# Patient Record
Sex: FEMALE | Race: AMERICAN INDIAN OR ALASKA NATIVE
[De-identification: names, ages, dates, MRNs, and addresses within clinical notes are randomized per-mention and may not be internally consistent; named-entity substitution may affect disease eponyms.]

---

## 2020-05-06 ENCOUNTER — HOSPITAL ENCOUNTER (EMERGENCY)
Dept: HOSPITAL 5 - ED | Age: 30
Discharge: HOME | End: 2020-05-06
Payer: COMMERCIAL

## 2020-05-06 VITALS — DIASTOLIC BLOOD PRESSURE: 97 MMHG | SYSTOLIC BLOOD PRESSURE: 152 MMHG

## 2020-05-06 DIAGNOSIS — F12.10: ICD-10-CM

## 2020-05-06 DIAGNOSIS — R07.89: Primary | ICD-10-CM

## 2020-05-06 DIAGNOSIS — I20.9: ICD-10-CM

## 2020-05-06 DIAGNOSIS — Z79.899: ICD-10-CM

## 2020-05-06 DIAGNOSIS — F15.10: ICD-10-CM

## 2020-05-06 DIAGNOSIS — F19.180: ICD-10-CM

## 2020-05-06 LAB
ALBUMIN SERPL-MCNC: 4.7 G/DL (ref 3.9–5)
ALT SERPL-CCNC: 21 UNITS/L (ref 7–56)
BASOPHILS # (AUTO): 0.1 K/MM3 (ref 0–0.1)
BASOPHILS NFR BLD AUTO: 0.7 % (ref 0–1.8)
BENZODIAZEPINES SCREEN,URINE: (no result)
BILIRUB UR QL STRIP: (no result)
BLOOD UR QL VISUAL: (no result)
BUN SERPL-MCNC: 7 MG/DL (ref 7–17)
BUN/CREAT SERPL: 9 %
CALCIUM SERPL-MCNC: 10.2 MG/DL (ref 8.4–10.2)
EOSINOPHIL # BLD AUTO: 0.1 K/MM3 (ref 0–0.4)
EOSINOPHIL NFR BLD AUTO: 1 % (ref 0–4.3)
HCT VFR BLD CALC: 39.5 % (ref 30.3–42.9)
HEMOLYSIS INDEX: 1
HGB BLD-MCNC: 13 GM/DL (ref 10.1–14.3)
LYMPHOCYTES # BLD AUTO: 1.9 K/MM3 (ref 1.2–5.4)
LYMPHOCYTES NFR BLD AUTO: 20.6 % (ref 13.4–35)
MCHC RBC AUTO-ENTMCNC: 33 % (ref 30–34)
MCV RBC AUTO: 79 FL (ref 79–97)
METHADONE SCREEN,URINE: (no result)
MONOCYTES # (AUTO): 0.5 K/MM3 (ref 0–0.8)
MONOCYTES % (AUTO): 5.6 % (ref 0–7.3)
OPIATE SCREEN,URINE: (no result)
PH UR STRIP: 7 [PH] (ref 5–7)
PLATELET # BLD: 245 K/MM3 (ref 140–440)
PROT UR STRIP-MCNC: (no result) MG/DL
RBC # BLD AUTO: 4.99 M/MM3 (ref 3.65–5.03)
RBC #/AREA URNS HPF: < 1 /HPF (ref 0–6)
UROBILINOGEN UR-MCNC: < 2 MG/DL (ref ?–2)
WBC #/AREA URNS HPF: 1 /HPF (ref 0–6)

## 2020-05-06 PROCEDURE — 85025 COMPLETE CBC W/AUTO DIFF WBC: CPT

## 2020-05-06 PROCEDURE — 84703 CHORIONIC GONADOTROPIN ASSAY: CPT

## 2020-05-06 PROCEDURE — 80307 DRUG TEST PRSMV CHEM ANLYZR: CPT

## 2020-05-06 PROCEDURE — 81001 URINALYSIS AUTO W/SCOPE: CPT

## 2020-05-06 PROCEDURE — 96374 THER/PROPH/DIAG INJ IV PUSH: CPT

## 2020-05-06 PROCEDURE — 84484 ASSAY OF TROPONIN QUANT: CPT

## 2020-05-06 PROCEDURE — 99284 EMERGENCY DEPT VISIT MOD MDM: CPT

## 2020-05-06 PROCEDURE — 36415 COLL VENOUS BLD VENIPUNCTURE: CPT

## 2020-05-06 PROCEDURE — 80053 COMPREHEN METABOLIC PANEL: CPT

## 2020-05-06 PROCEDURE — 93005 ELECTROCARDIOGRAM TRACING: CPT

## 2020-05-06 PROCEDURE — 71045 X-RAY EXAM CHEST 1 VIEW: CPT

## 2020-05-06 NOTE — XRAY REPORT
CHEST 1 VIEW 



INDICATION: 

Chest Pain.



COMPARISON: 

None.



FINDINGS:

Support devices: None.



Heart: Within normal limits. 

Lungs/Pleura: No acute air space or interstitial disease. 



Additional findings: None.



IMPRESSION: No acute abnormality.



Signer Name: Elliot Salgado MD 

Signed: 5/6/2020 2:19 AM

Workstation Name: [id number]

## 2020-05-06 NOTE — EMERGENCY DEPARTMENT REPORT
ED Chest Pain HPI





- General


Chief Complaint: Chest Pain


Stated Complaint: CHEST PAIN, SHORT OF BREATH


Source: patient


Mode of arrival: Ambulatory


Limitations: No Limitations





- History of Present Illness


Initial Comments: 





Patient is a A0 29-year-old -American female with past medical 

history anxiety and depression who presents to the ED with complaint of acute 

onset persistent severe left-sided chest pain that radiates to the left arm with

shortness of breath and palpitations for the last 1 hour after smoking marijuana

and methamphetamine prior to arrival in the ED.  Patient states that she was 

with a friend of has when she told her friend that she was extremely anxious but

does not have any medicine for the same when the friend offered some tablets 

claiming that the tablets were for anxiety and would help relieve her anxiety.  

Patient states that she took the tablets but was unsure what these were.  

Patient states that there after she ended up smoking marijuana with a friend and

started developing persistent chest pain which shortness of breath.  Patient 

states that she has never smoked these drugs before and this was her first time 

trying.  Patient also states that prior to using the drugs, they had been 

drinking together with a friend.  Patient denies diaphoresis, dizziness, 

headache, change in vision, syncope, fever, chills, cough, loss of cons

ciousness, sore throat, dysphagia, dysphonia, wheezing, abdominal pain, nausea, 

vomiting, diarrhea, hallucinations, suicidal ideation or homicidal ideation, 

neck pain or back pain.  


MD Complaint: chest pain (left-sided chest wall pain that radiates to the left 

arm), other (dyspnea)


-: Sudden, hour(s) (1)


Onset: during rest, associated with drug use (marijuana and amphetamines)


Pain Location: substernal, left chest


Pain Radiation: LUE (left shoulder and arm)


Severity: severe


Severity scale (0 -10): 10


Quality: tightness, heaviness, sharp, pressure


Consistency: constant


Improves With: nothing


Worsens With: nothing


re: dyspnea.  denies: nausea, vomting, diaphoresis, sense of impending doom


Other Symptoms: denies: cough, fever, syncope, rash, acid taste in mouth, leg 

swelling, palpitations, burping, other


Treatments Prior to Arrival: none


Aspirin use within the Past 7 Days: (0) No





- Related Data


On Oral Contraceptives: No


                                  Previous Rx's











 Medication  Instructions  Recorded  Last Taken  Type


 


Naproxen 500 mg PO Q12H PRN #20 tablet 20 Unknown Rx


 


hydrOXYzine PAMOATE [Vistaril] 25 mg PO Q6HR PRN #30 capsule 20 Unknown Rx











                                    Allergies











Allergy/AdvReac Type Severity Reaction Status Date / Time


 


No Known Allergies Allergy   Verified 20 01:33














Heart Score





- HEART Score


History: Slightly suspicious


EKG: Normal


Age: < 45


Risk factors: No known risk factors


Troponin: < normal limit


HEART Score: 0





- Critical Actions


Critical Actions: 0-3 pts:0.9-1.7%risk of adverse cardiac event.Candidate for 

discharge





ED Review of Systems


ROS: 


Stated complaint: CHEST PAIN, SHORT OF BREATH


Other details as noted in HPI





Constitutional: denies: chills, fever


Eyes: denies: eye pain, eye discharge, vision change


ENT: denies: ear pain, throat pain


Respiratory: shortness of breath.  denies: cough, wheezing


Cardiovascular: chest pain (left-sided chest wall).  denies: palpitations


Endocrine: no symptoms reported


Gastrointestinal: denies: abdominal pain, nausea, diarrhea


Genitourinary: denies: urgency, dysuria, discharge


Musculoskeletal: arthralgia (left arm pain).  denies: back pain, joint swelling


Skin: denies: rash, lesions


Neurological: denies: headache, weakness, paresthesias


Psychiatric: anxiety.  denies: depression, auditory hallucinations, visual 

hallucinations, homicidal thoughts, suicidal thoughts


Hematological/Lymphatic: denies: easy bleeding, easy bruising





ED Past Medical Hx





- Past Medical History


Previous Medical History?: No





- Surgical History


Past Surgical History?: No





- Social History


Smoking Status: Never Smoker


Substance Use Type: Alcohol, Marijuana, Methamphetamines





- Medications


Home Medications: 


                                Home Medications











 Medication  Instructions  Recorded  Confirmed  Last Taken  Type


 


Naproxen 500 mg PO Q12H PRN #20 tablet 20  Unknown Rx


 


hydrOXYzine PAMOATE [Vistaril] 25 mg PO Q6HR PRN #30 capsule 20  Unknown 

Rx














ED Physical Exam





- General


Limitations: No Limitations


General appearance: alert, in no apparent distress, anxious, obese





- Head


Head exam: Present: atraumatic, normocephalic, normal inspection





- Eye


Eye exam: Present: normal appearance, PERRL, EOMI.  Absent: scleral icterus, 

conjunctival injection, nystagmus


Pupils: Present: normal accommodation





- ENT


ENT exam: Present: normal exam, normal orophraynx, mucous membranes moist, TM's 

normal bilaterally, normal external ear exam





- Neck


Neck exam: Present: normal inspection, full ROM.  Absent: tenderness, 

meningismus, lymphadenopathy, thyromegaly





- Respiratory


Respiratory exam: Present: normal lung sounds bilaterally, chest wall tenderness

 (Palpable reproducible severe left-sided chest wall tenderness).  Absent: 

respiratory distress, wheezes, rales, rhonchi, stridor, accessory muscle use, 

decreased breath sounds





- Cardiovascular


Cardiovascular Exam: Present: regular rate, normal rhythm, normal heart sounds. 

 Absent: systolic murmur, diastolic murmur, rubs, gallop





- GI/Abdominal


GI/Abdominal exam: Present: soft, normal bowel sounds.  Absent: tenderness, 

guarding, rebound, hyperactive bowel sounds, hypoactive bowel sounds, 

organomegaly





- Extremities Exam


Extremities exam: Present: normal inspection, full ROM, normal capillary refill.

  Absent: tenderness, pedal edema





- Back Exam


Back exam: Present: normal inspection, full ROM.  Absent: tenderness, CVA 

tenderness (R), CVA tenderness (L), muscle spasm, paraspinal tenderness, 

vertebral tenderness





- Neurological Exam


Neurological exam: Present: alert, oriented X3, CN II-XII intact, normal gait, 

reflexes normal





- Psychiatric


Psychiatric exam: Present: normal affect, normal mood, anxious (and restless).  

Absent: suicidal ideation





- Skin


Skin exam: Present: warm, dry, intact, normal color.  Absent: rash





ED Course


                                   Vital Signs











  20





  01:35 02:20 02:40


 


Temperature 98.5 F  


 


Pulse Rate 78 104 H 139 H


 


Respiratory 20 23 26 H





Rate   


 


Blood Pressure 161/80  


 


O2 Sat by Pulse 96  100





Oximetry   














  20





  03:30 04:00 04:30


 


Temperature   


 


Pulse Rate 86 83 80


 


Respiratory 24 26 H 26 H





Rate   


 


Blood Pressure 149/89 153/95 143/95


 


O2 Sat by Pulse 100 99 100





Oximetry   














  20





  05:00


 


Temperature 


 


Pulse Rate 83


 


Respiratory 12





Rate 


 


Blood Pressure 152/97


 


O2 Sat by Pulse 100





Oximetry 














FREDERIC score





- Frederic Score


Age > 65: (0) No


Aspirin use within the Past 7 Days: (0) No


3 or more CAD Risk Factors: (0) No


2 or more Angina events in past 24 hrs: (0) No


Known CAD with more than 50% Stenosis: (0) No


Elevated Cardiac Markers: (0) No


ST Deviation Greater than 0.5mm: (0) No


FREDERIC Score: 0





ED Medical Decision Making





- Lab Data


Result diagrams: 


                                 20 02:31





                                 20 02:31





- EKG Data


EKG shows normal: sinus rhythm


Rate: normal





- EKG Data


Interpretation: normal EKG





20 05:43


The EKG shows normal sinus rhythm with ventricular rate of 78 bpm and no ST or T

 wave abnormalities or pathological Q waves.





- Radiology Data


Radiology results: report reviewed, image reviewed





Findings





[facility] 


[address] [place] [address] 





XRay Report 


Signed 





 Patient: FELIBERTO URBAN MR#: 


[id number] 


 : 1990 Acct:[id number] 





 Age/Sex: 29 / F ADM Date: 20 





 Loc: ED 


 Attending Dr: 








 Ordering Physician: SARAH SEGUNDO DO 


 Date of Service: 20 


 Procedure(s): XR chest 1V ap 


 Accession Number(s): [id number] 





 cc: SARAH SEGUNDO DO 





 Fluoro Time In Minutes: 





 CHEST 1 VIEW 





INDICATION: 


Chest Pain. 





COMPARISON: 


None. 





FINDINGS: 


Support devices: None. 





Heart: Within normal limits. 


Lungs/Pleura: No acute air space or interstitial disease. 





Additional findings: None. 





IMPRESSION: No acute abnormality. 





Signer Name: Elliot Salgado MD 


Signed: 2020 2:19 AM 


Workstation Name: [id number] 








 Transcribed By: ES 


 Dictated By: Elliot Salgado MD 


 Electronically Authenticated By: Elliot Salgado MD 


 Signed Date/Time: 20 











 DD/DT: 20 


 TD/TT: 





- Medical Decision Making





This is a A0 29-year-old -American female with past medical history 

anxiety and depression who presents to the ED with complaint of acute onset 

persistent severe left-sided chest pain that radiates to the left arm with 

shortness of breath and palpitations for the last 1 hour after smoking marijuana

 and methamphetamine prior to arrival in the ED.  Patient states that she was 

with a friend of has when she told her friend that she was extremely anxious but

 does not have any medicine for the same when the friend offered some tablets 

claiming that the tablets were for anxiety and would help relieve her anxiety.  

Patient states that she took the tablets but was unsure what these were.  

Patient states that there after she ended up smoking marijuana with a friend and

 started developing persistent chest pain which shortness of breath.  Patient 

states that she has never smoked these drugs before and this was her first time 

trying.  Patient also states that prior to using the drugs, they had been 

drinking together with a friend.  In the ED, patient is alert and oriented x3, 

anxious but in no acute distress, tachycardic, tachypneic and afebrile in 

triage.  Patient pacing back and forth in the room during the history taking and

 in between physical exam.  The EKG shows normal sinus rhythm with ventricular 

rate of 78 bpm, and no ST or T wave abnormalities or pathological Q waves.  

Chest x-ray shows no acute cardiopulmonary abnormalities or pneumonitis.  

Patient was treated with aspirin and also given Ativan for anxiety.  Lab test 

results were reviewed and showed acute hypokalemia of 3.0 mmol/L and mild 

hyperglycemia of 113 mg/dL.  Urine drug screen was presumptively positive for 

marijuana and amphetamines.  The rest of the lab test results were nonactionable

 including the initial and repeat troponin levels.  The vital signs were 

repeated after the patient was treated with medications and was normal.  Patient

 heart score is 0 based on the patient past medical history and risk factors for

 coronary artery disease.  On reevaluation, patient's chest pain resolved, 

anxiety also resolved and vital signs normalized with treatment.  Patient was 

discharged home with pain medication and Vistaril for anxiety and advised to fol

low-up with her primary care physician in 3 to 5 days for reevaluation.  Patient

 was also counseled on the dangers of drug abuse and the risks she poses for her

 health when using street drugs.  Patient verbalized understanding and promised 

that she would change her lifestyle.  Patient was advised to return to the ED 

immediately if symptoms get worse.





- Differential Diagnosis


CAD; Anxiety; Drug abuse; Costochondritis; Muscle strain; PE; Pneumonia


Critical care attestation.: 


If time is entered above; I have spent that time in minutes in the direct care 

of this critically ill patient, excluding procedure time.








ED Disposition


Clinical Impression: 


 Left-sided chest wall pain, Angina pectoris, unspecified, Anxiety as acute 

reaction to exceptional stress, Polysubstance abuse





Disposition: DC- TO HOME OR SELFCARE


Is pt being admited?: No


Does the pt Need Aspirin: No


Condition: Stable


Instructions:  Angina (ED), Chest Pain (ED), Costochondritis (ED), Polysubstance

 Abuse (ED)


Additional Instructions: 


Take medication as needed for pain and anxiety, drink plenty of fluids and 

follow-up with your primary care physician in 3 to 5 days for reevaluation.  

Consider quitting marijuana and methamphetamine use.  Return to the ED 

immediately if symptoms get worse.


Prescriptions: 


Naproxen 500 mg PO Q12H PRN #20 tablet


 PRN Reason: Pain , Severe (7-10)


hydrOXYzine PAMOATE [Vistaril] 25 mg PO Q6HR PRN #30 capsule


 PRN Reason: Anxiety


Referrals: 


[facility] [Provider Group] - 3-5 Days


Time of Disposition: 05:21


Print Language: ENGLISH

## 2022-01-21 ENCOUNTER — OFFICE VISIT (OUTPATIENT)
Dept: URBAN - METROPOLITAN AREA CLINIC 84 | Facility: CLINIC | Age: 32
End: 2022-01-21

## 2022-02-17 ENCOUNTER — WEB ENCOUNTER (OUTPATIENT)
Dept: URBAN - METROPOLITAN AREA CLINIC 84 | Facility: CLINIC | Age: 32
End: 2022-02-17

## 2022-02-17 ENCOUNTER — OFFICE VISIT (OUTPATIENT)
Dept: URBAN - METROPOLITAN AREA CLINIC 84 | Facility: CLINIC | Age: 32
End: 2022-02-17
Payer: COMMERCIAL

## 2022-02-17 ENCOUNTER — DASHBOARD ENCOUNTERS (OUTPATIENT)
Age: 32
End: 2022-02-17

## 2022-02-17 VITALS
HEART RATE: 81 BPM | SYSTOLIC BLOOD PRESSURE: 102 MMHG | HEIGHT: 64 IN | TEMPERATURE: 97.6 F | DIASTOLIC BLOOD PRESSURE: 58 MMHG | BODY MASS INDEX: 48.35 KG/M2 | WEIGHT: 283.2 LBS

## 2022-02-17 DIAGNOSIS — K21.9 GERD: ICD-10-CM

## 2022-02-17 DIAGNOSIS — R10.13 DYSPEPSIA: ICD-10-CM

## 2022-02-17 DIAGNOSIS — Z01.818 PREOPERATIVE TESTING: ICD-10-CM

## 2022-02-17 PROCEDURE — 99204 OFFICE O/P NEW MOD 45 MIN: CPT | Performed by: INTERNAL MEDICINE

## 2022-02-17 NOTE — HPI-TODAY'S VISIT:
The patient was referred by Dr. Donny Chowdhury for EGD .   A copy of this document is being forwarded to the referring provider.  Overall she feels well.  She tanya anroexia or weight loss.  She denies abdominal pain.  She has occasional supine regurgitation with associated heartburn.  It has been more frequent lately.  She has some bloat.  She has had some early satiety. She has some mild nausea but no vomiting.  She denies dysphagia.  SHe denies LGI symptoms.  She dneies lGI bleed or melena.  She is not on naroctics.  She does smoke weed.  There is no FHx of colon cancer.  She is being evaluated for bariatric srgery and they are requesting a pre-op EGD.  She is being evaluated for the gastric sleeve

## 2022-02-17 NOTE — PHYSICAL EXAM NECK/THYROID:
Call placed to patient and message relayed.  Patient was very upset about this whole process.  She stated that at her visit last week she was very unhappy how things went, she stated the doctor just came in, sat down and began talking, no introduction, he spoke too fast and did not ask if they had any questions. Very little to no eye contact, with the mask and his accent it was very hard to keep up.  She stated its like he already had his plan mapped out and rushed to get to the next patient, \"I felt like a number\".  They were also unhappy that he did not go over any possible side effects or adverse reactions of the Reclast.  This is a new drug for her and it makes her nervous.  Writer did discuss the above concerns, side effects and reactions reviewed along with purpose of drug and its action.  Patient was pleased with discussion and gave the approval for the PA to be done on the reclast.   PA started for the IV reclast, patient wishes for this not to be given until mid Feb 2021.     normal appearance , without tenderness upon palpation , no deformities , trachea midline , Thyroid normal size , no thyroid nodules , no masses , no JVD , thyroid nontender

## 2022-03-23 PROBLEM — 235595009 GASTROESOPHAGEAL REFLUX DISEASE: Status: ACTIVE | Noted: 2022-02-17

## 2022-04-04 ENCOUNTER — OFFICE VISIT (OUTPATIENT)
Dept: URBAN - METROPOLITAN AREA SURGERY CENTER 20 | Facility: SURGERY CENTER | Age: 32
End: 2022-04-04

## 2022-04-04 ENCOUNTER — TELEPHONE ENCOUNTER (OUTPATIENT)
Dept: URBAN - METROPOLITAN AREA CLINIC 92 | Facility: CLINIC | Age: 32
End: 2022-04-04